# Patient Record
Sex: FEMALE | URBAN - METROPOLITAN AREA
[De-identification: names, ages, dates, MRNs, and addresses within clinical notes are randomized per-mention and may not be internally consistent; named-entity substitution may affect disease eponyms.]

---

## 2021-08-18 ENCOUNTER — LAB OUTSIDE AN ENCOUNTER (OUTPATIENT)
Dept: URBAN - METROPOLITAN AREA CLINIC 37 | Facility: CLINIC | Age: 31
End: 2021-08-18

## 2021-08-18 ENCOUNTER — OFFICE VISIT (OUTPATIENT)
Dept: URBAN - METROPOLITAN AREA CLINIC 37 | Facility: CLINIC | Age: 31
End: 2021-08-18

## 2021-08-18 VITALS
BODY MASS INDEX: 37.32 KG/M2 | TEMPERATURE: 98.8 F | HEART RATE: 88 BPM | DIASTOLIC BLOOD PRESSURE: 100 MMHG | HEIGHT: 65 IN | SYSTOLIC BLOOD PRESSURE: 138 MMHG | OXYGEN SATURATION: 98 % | WEIGHT: 224 LBS

## 2021-08-18 RX ORDER — LIRAGLUTIDE 6 MG/ML
AS DIRECTED INJECTION, SOLUTION SUBCUTANEOUS
Status: ON HOLD | COMMUNITY

## 2021-08-18 RX ORDER — GABAPENTIN 100 MG/1
1 CAPSULE CAPSULE ORAL
Status: ON HOLD | COMMUNITY

## 2021-08-18 RX ORDER — FUROSEMIDE 20 MG/1
1 TABLET TABLET ORAL ONCE A DAY
Qty: 30 | Status: ON HOLD | COMMUNITY

## 2021-08-18 NOTE — HPI-MIGRATED HPI
Initial consultation : Patient is here for -> GERD, acid reflux; Onset of symptoms: 1 years and has been worse.; Characteristic: intermittent cramp pain in the abdomen. She does not have difficulty in swallowing but when she drinks liquid ( water, alcohol), she seems to have problems with digestion which cause her reflux or vomit ; Aggravating factors: high carbohydrate foods; Associated sx: nausea with vomiting (2-3 episodes/week); Current BM patterns: one BM daily, in the past it was irregular; Medications tried: Pt is Sucrafate TID and Omeprazole 40mg QD prescribed by PCP, Tums, Pepto Bismol, but mild relief ; Family history of GI malignancy: denies, pt was adopted.; Tests/evaluations done previously: Denies prior EGD/Colonoscopy;

## 2021-08-25 ENCOUNTER — TELEPHONE ENCOUNTER (OUTPATIENT)
Dept: URBAN - METROPOLITAN AREA CLINIC 35 | Facility: CLINIC | Age: 31
End: 2021-08-25

## 2021-08-25 ENCOUNTER — OFFICE VISIT (OUTPATIENT)
Dept: URBAN - METROPOLITAN AREA SURGERY CENTER 8 | Facility: SURGERY CENTER | Age: 31
End: 2021-08-25

## 2021-09-27 ENCOUNTER — DASHBOARD ENCOUNTERS (OUTPATIENT)
Age: 31
End: 2021-09-27

## 2021-09-28 ENCOUNTER — OFFICE VISIT (OUTPATIENT)
Dept: URBAN - METROPOLITAN AREA CLINIC 35 | Facility: CLINIC | Age: 31
End: 2021-09-28

## 2021-09-28 VITALS — BODY MASS INDEX: 37.32 KG/M2 | HEIGHT: 65 IN | WEIGHT: 224 LBS

## 2021-09-28 PROBLEM — 84568007: Status: ACTIVE | Noted: 2021-09-27

## 2021-09-28 PROBLEM — 414916001 OBESITY: Status: ACTIVE | Noted: 2021-09-27

## 2021-09-28 PROBLEM — 39839004: Status: ACTIVE | Noted: 2021-09-28

## 2021-09-28 PROBLEM — 162031009: Status: ACTIVE | Noted: 2021-08-18

## 2021-09-28 PROBLEM — 102614006: Status: ACTIVE | Noted: 2021-08-18

## 2021-09-28 PROBLEM — 16932000: Status: ACTIVE | Noted: 2021-08-18

## 2021-09-28 PROBLEM — 371132002: Status: ACTIVE | Noted: 2021-09-28

## 2021-09-28 RX ORDER — FUROSEMIDE 20 MG/1
1 TABLET TABLET ORAL ONCE A DAY
Qty: 30 | Status: ON HOLD | COMMUNITY

## 2021-09-28 RX ORDER — GABAPENTIN 100 MG/1
1 CAPSULE CAPSULE ORAL
Status: ON HOLD | COMMUNITY

## 2021-09-28 RX ORDER — LIRAGLUTIDE 6 MG/ML
AS DIRECTED INJECTION, SOLUTION SUBCUTANEOUS
Status: ON HOLD | COMMUNITY

## 2021-09-28 NOTE — HPI-MIGRATED HPI
Post-op OV : After EGD on -> 08/25/2021, done due to nausea, vomiting and indigestion. A large hiatal hernia was present. Mucosal changes were found in the lower third of the esophagus with distal esophageal bxx showing minimal nonspecific inflammation, negative for IM. The GE junction was irregular. Erythematous mucosa was found in the gastric body with bxx showing mild chronic gastritis. Erythematous mucosa was found in the gastric antrum with bxx showing mild chronic gastritis with reactive gastropathy and patchy mild atrophy. No evidence of H. pylori or IM. Normal duodenum;   Post-op OV : Patient reports of current symptoms -> still with bad regurgitation at nights, even while on meds;   Post-op OV : Patient denies -> rectal bleeding, fever, nausea & vomiting since the procedure date;   Post-op OV : Patient -> denies any new changes in his/her health status since last OV;

## 2021-09-28 NOTE — EXAM-MIGRATED EXAMINATIONS
GENERAL APPEARANCE: - alert, in no acute distress, well developed, well nourished;   HEAD: - normocephalic, atraumatic;   EYES: - sclera anicteric bilaterally;   NECK/THYROID: - neck supple, full range of motion, no jugular venous distention, no lymphadenopathy, no thyroid nodules, no thyromegaly, trachea midline;   NEUROLOGIC: - cranial nerves 2-12 grossly intact;   PSYCH: - cooperative with exam, mood/affect full range;